# Patient Record
Sex: MALE | Race: WHITE | Employment: FULL TIME | URBAN - METROPOLITAN AREA
[De-identification: names, ages, dates, MRNs, and addresses within clinical notes are randomized per-mention and may not be internally consistent; named-entity substitution may affect disease eponyms.]

---
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
## 2022-08-15 ENCOUNTER — TELEPHONE (OUTPATIENT)
Dept: ALLERGY | Facility: CLINIC | Age: 22
End: 2022-08-15

## 2022-08-15 NOTE — TELEPHONE ENCOUNTER
[facility] Call Center    Phone Message    May a detailed message be left on voicemail: yes     Reason for Call: Other: Pt's mother has questions about allergy shots. Please call [phone]. Thanks!     Action Taken: Message routed to:  [facility] ([facility]): ALLERGY    Travel Screening: Not Applicable

## 2022-08-18 NOTE — TELEPHONE ENCOUNTER
Called and spoke with Ileana, she notes that her son has a 3 year plan for allergy shots and already has the serums. They are currently driving to [place] to [facility] for the shots. Charlie is going to be a [employer] student. Mom tried to get him into [facility] for his shots and they informed her that they are not able to accommodate due to still following the old Covid guidelines of social distancing.     I explained to mom that Charlie would have to have a consult with Dr. Triana and Dr. Triana would order his own serums as we do not take outside serums. Ileana was very appreciative of the call back and she will continue to try to get Charlie into [facility].